# Patient Record
Sex: FEMALE | URBAN - METROPOLITAN AREA
[De-identification: names, ages, dates, MRNs, and addresses within clinical notes are randomized per-mention and may not be internally consistent; named-entity substitution may affect disease eponyms.]

---

## 2023-05-31 ENCOUNTER — APPOINTMENT (RX ONLY)
Dept: URBAN - METROPOLITAN AREA CLINIC 43 | Facility: CLINIC | Age: 88
Setting detail: DERMATOLOGY
End: 2023-05-31

## 2023-05-31 DIAGNOSIS — L24.9 IRRITANT CONTACT DERMATITIS, UNSPECIFIED CAUSE: ICD-10-CM

## 2023-05-31 PROCEDURE — 99203 OFFICE O/P NEW LOW 30 MIN: CPT

## 2023-05-31 PROCEDURE — ? COUNSELING

## 2023-05-31 PROCEDURE — ? PRESCRIPTION

## 2023-05-31 RX ORDER — HYDROCORTISONE 25 MG/G
CREAM TOPICAL
Qty: 20 | Refills: 2 | Status: ERX | COMMUNITY
Start: 2023-05-31

## 2023-05-31 RX ADMIN — HYDROCORTISONE: 25 CREAM TOPICAL at 00:00

## 2023-05-31 ASSESSMENT — LOCATION DETAILED DESCRIPTION DERM
LOCATION DETAILED: SUPERIOR MID FOREHEAD
LOCATION DETAILED: LEFT MEDIAL FOREHEAD

## 2023-05-31 ASSESSMENT — LOCATION SIMPLE DESCRIPTION DERM
LOCATION SIMPLE: LEFT FOREHEAD
LOCATION SIMPLE: SUPERIOR FOREHEAD

## 2023-05-31 ASSESSMENT — LOCATION ZONE DERM: LOCATION ZONE: FACE

## 2023-05-31 NOTE — PROCEDURE: COUNSELING
Moisturizer Recommendations: Barrier repair moisturizers with Cetaphil, CeraVe or VaniCreme cream.  Ok to use Vaseline throughout the day if needed
Antihistamine Recommendations: Zyrtec- 1 pill twice daily.  Ok to use Benadryl at night.
Detail Level: Simple
Cleanser Recommendations: Cetaphil, CeraVe or Glycerin bar cleanser 1-2 times daily.
Topical Steroids Recommendations: Ok to use Hydrocortisone 2.5% cream twice daily for 2 days to clear current redness.